# Patient Record
Sex: MALE | URBAN - METROPOLITAN AREA
[De-identification: names, ages, dates, MRNs, and addresses within clinical notes are randomized per-mention and may not be internally consistent; named-entity substitution may affect disease eponyms.]

---

## 2023-01-15 ENCOUNTER — EMERGENCY (EMERGENCY)
Facility: HOSPITAL | Age: 43
LOS: 0 days | Discharge: HOME | End: 2023-01-16
Attending: EMERGENCY MEDICINE
Payer: COMMERCIAL

## 2023-01-15 VITALS
DIASTOLIC BLOOD PRESSURE: 98 MMHG | HEIGHT: 68 IN | TEMPERATURE: 99 F | WEIGHT: 240.08 LBS | SYSTOLIC BLOOD PRESSURE: 147 MMHG | HEART RATE: 112 BPM | RESPIRATION RATE: 18 BRPM | OXYGEN SATURATION: 100 %

## 2023-01-15 DIAGNOSIS — S89.91XA UNSPECIFIED INJURY OF RIGHT LOWER LEG, INITIAL ENCOUNTER: ICD-10-CM

## 2023-01-15 DIAGNOSIS — S83.91XA SPRAIN OF UNSPECIFIED SITE OF RIGHT KNEE, INITIAL ENCOUNTER: ICD-10-CM
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
## 2023-01-15 DIAGNOSIS — S93.601A UNSPECIFIED SPRAIN OF RIGHT FOOT, INITIAL ENCOUNTER: ICD-10-CM
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
## 2023-01-15 DIAGNOSIS — G89.11 ACUTE PAIN DUE TO TRAUMA: ICD-10-CM

## 2023-01-15 DIAGNOSIS — M79.651 PAIN IN RIGHT THIGH: ICD-10-CM
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
## 2023-01-15 DIAGNOSIS — S73.101A UNSPECIFIED SPRAIN OF RIGHT HIP, INITIAL ENCOUNTER: ICD-10-CM

## 2023-01-15 PROCEDURE — 73552 X-RAY EXAM OF FEMUR 2/>: CPT | Mod: 26,RT

## 2023-01-15 PROCEDURE — 73560 X-RAY EXAM OF KNEE 1 OR 2: CPT | Mod: 26,RT

## 2023-01-15 PROCEDURE — 73630 X-RAY EXAM OF FOOT: CPT | Mod: RT

## 2023-01-15 PROCEDURE — 96375 TX/PRO/DX INJ NEW DRUG ADDON: CPT

## 2023-01-15 PROCEDURE — 99284 EMERGENCY DEPT VISIT MOD MDM: CPT | Mod: 25

## 2023-01-15 PROCEDURE — 73502 X-RAY EXAM HIP UNI 2-3 VIEWS: CPT | Mod: RT

## 2023-01-15 PROCEDURE — 73552 X-RAY EXAM OF FEMUR 2/>: CPT | Mod: RT

## 2023-01-15 PROCEDURE — 73590 X-RAY EXAM OF LOWER LEG: CPT | Mod: 26,RT

## 2023-01-15 PROCEDURE — 96374 THER/PROPH/DIAG INJ IV PUSH: CPT

## 2023-01-15 PROCEDURE — 73590 X-RAY EXAM OF LOWER LEG: CPT | Mod: RT

## 2023-01-15 PROCEDURE — 73560 X-RAY EXAM OF KNEE 1 OR 2: CPT | Mod: RT

## 2023-01-15 PROCEDURE — 73502 X-RAY EXAM HIP UNI 2-3 VIEWS: CPT | Mod: 26,RT

## 2023-01-15 PROCEDURE — 99284 EMERGENCY DEPT VISIT MOD MDM: CPT

## 2023-01-15 RX ORDER — MORPHINE SULFATE 50 MG/1
4 CAPSULE, EXTENDED RELEASE ORAL ONCE
Refills: 0 | Status: DISCONTINUED | OUTPATIENT
Start: 2023-01-15 | End: 2023-01-15

## 2023-01-15 RX ORDER — ONDANSETRON 8 MG/1
4 TABLET, FILM COATED ORAL ONCE
Refills: 0 | Status: COMPLETED | OUTPATIENT
Start: 2023-01-15 | End: 2023-01-15

## 2023-01-15 RX ADMIN — MORPHINE SULFATE 4 MILLIGRAM(S): 50 CAPSULE, EXTENDED RELEASE ORAL at 23:36

## 2023-01-15 RX ADMIN — ONDANSETRON 4 MILLIGRAM(S): 8 TABLET, FILM COATED ORAL at 23:35

## 2023-01-15 NOTE — ED PROVIDER NOTE - PHYSICAL EXAMINATION
PA NOTE: GEN: AOX3, NAD. HEENT: Throat clear. Airway is patent. EYES: PERRLA. EOMI. Head: NC/AT. NECK: Supple, No JVD. FROM. C-spine non-tender. CV:S1S2, RRR, LUNGS: Non-labored breathing, no tachypnea. O2sat 100% RA. CTA b/l. No w/r/r. CHEST: Equal chest expansion and rise. No deformity. ABD: Soft, NT/ND, no rebound, no guarding. No CVAT. EXT: No e/c/c. 2+ distal pulses. RIGHT LEG: +Mild tenderness right hip, right knee, right shin and dorsum of right foot. Painful ROM. No obvious bony deformity. 2+ distal pulses. Able to straight raise RLE with pain. NVI. SKIN: No rashes. NEURO: No focal deficits. CN II-XII intact. FROM. 5/5 motor and sensory. ~Isaias Woodall PA-C

## 2023-01-15 NOTE — ED PROVIDER NOTE - CARE PROVIDER_API CALL
Juan Kennedy)  Orthopaedic Surgery  [address], [place] [id number]  Phone: [phone]  Fax: [phone]  Follow Up Time: Urgent

## 2023-01-15 NOTE — ED PROVIDER NOTE - ATTENDING APP SHARED VISIT CONTRIBUTION OF CARE
Attending Contribution to Care: I, Rosio Wilder, performed the initial face to face bedside interview with this patient regarding history of present illness, review of symptoms and relevant past medical, social and family history.  I completed an independent physical examination.  I was the initial provider who evaluated this patient. I have signed out the follow up of any pending tests (i.e. labs, radiological studies) to the ACP.  I have communicated the patient’s plan of care and disposition with the ACP.

## 2023-01-15 NOTE — ED ADULT TRIAGE NOTE - CHIEF COMPLAINT QUOTE
BIB EMS PT A&OX3 SPEAKING IN FULL SENTENCES. 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 C/O RIGHT HIP PAIN RADIATING DOWN LEG, UNABLE TO WALK. DENIES CP/SOB/N/V/D/FEVER/CHILLS. NO SIGNIFICANT MEDICAL HX. BIB EMS PT A&OX3 SPEAKING IN FULL SENTENCES. 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 C/O RIGHT HIP PAIN RADIATING DOWN LEG, UNABLE TO WALK. DENIES anticocagulation, loc. denies CP/SOB/N/V/D/FEVER/CHILLS. NO SIGNIFICANT MEDICAL HX.

## 2023-01-15 NOTE — ED PROVIDER NOTE - NSFOLLOWUPINSTRUCTIONS_ED_ALL_ED_FT
Foot Sprain       A foot sprain is an injury to one of the ligaments in the feet. Ligaments are strong tissues that connect bones to each other. The ligament can be stretched too much. In some cases, it may tear. A tear can be either partial or complete. The severity of the sprain depends on how much of the ligament was damaged or torn.      What are the causes?    This condition is usually caused by suddenly twisting or pivoting your foot.      What increases the risk?    You are more likely to develop this condition if:  •You play a sport, such as basketball or football.      •You exercise or play a sport without first warming up your muscles.      •You start a new workout or sport.      •You suddenly increase how long or hard you exercise or play a sport.      •You have injured your foot or ankle before.        What are the signs or symptoms?    Symptoms of this condition start soon after an injury and include:  •Pain, especially in the arch of your foot.      •Bruising.      •Swelling.      •Being unable to walk or use your foot to support body weight.        How is this diagnosed?    This condition is diagnosed with a medical history and physical exam. You may also have imaging tests, such as:  •X-rays to check for broken bones (fractures).      •An MRI to see if the ligament is torn.        How is this treated?    Treatment for this condition depends on the severity of the sprain. Mild sprains and major sprains can be treated with:  •Rest, ice, pressure (compression), and elevation (RICE). Elevation means raising your injured foot.      •Keeping your foot in a fixed position (immobilization) for a period of time. This is done if your ligament is overstretched or partially torn. Your health care provider will apply a bandage, splint, or walking boot to keep your foot from moving until it heals.      •Using crutches or a scooter for a few weeks to avoid bearing weight on your foot while it is healing.      •Physical therapy exercises to improve movement and strength in your foot.      Major sprains may also be treated with:  •Surgery. This is done if your ligament is fully torn and a procedure is needed to reconnect it to the bone.      •A cast or splint. This will be needed after surgery. A cast or splint will need to stay on your foot while it heals.        Follow these instructions at home:    If you have a bandage, splint, or boot:     •Wear it as told by your health care provider. Remove it only as told by your health care provider.      •Loosen it if your toes tingle, become numb, or turn cold and blue.      •Keep it clean and dry.      If you have a cast:     • Do not put pressure on any part of the cast until it is fully hardened. This may take several hours.      • Do not stick anything inside the cast to scratch your skin. Doing that increases your risk for infection.      •Check the skin around the cast every day. Tell your health care provider about any concerns.      •You may put lotion on dry skin around the edges of the cast. Do not put lotion on the skin underneath the cast.      •Keep it clean and dry.      Bathing     • Do not take baths, swim, or use a hot tub until your health care provider approves. Ask your health care provider if you may take showers. You may only be allowed to take sponge baths.    •If the bandage, splint, boot, or cast is not waterproof:  •Do not let it get wet.      •Cover it with a watertight covering when you take a bath or shower.          Managing pain, stiffness, and swelling    •If directed, put ice on the injured area. To do this:  •If you have a removable bandage, splint, or boot, remove it as told by your health care provider.      •Put ice in a plastic bag.      •Place a towel between your skin and the bag, or between your cast and the bag.      •Leave the ice on for 20 minutes, 2–3 times per day.      •Remove the ice if your skin turns bright red. This is very important. If you cannot feel pain, heat, or cold, you have a greater risk of damage to the area.        •Move your toes often to reduce stiffness and swelling.      •Elevate the injured area above the level of your heart while you are sitting or lying down.      Activity     • Do not use the injured foot to support your body weight until your health care provider says that you can. Use crutches or a scooter as told by your health care provider.      •Ask your health care provider what activities are safe for you. Do exercises as told by your health care provider.      •Gradually increase how much and how far you walk until your health care provider says it is safe to return to full activity.      Driving     •Ask your health care provider if the medicine prescribed to you requires you to avoid driving or using machinery.      •Ask your health care provider when it is safe to drive if you have a bandage, splint, boot, or cast on your foot.      General instructions     •Take over-the-counter and prescription medicines only as told by your health care provider.      •When you can walk without pain, wear supportive shoes that have stiff soles. Do not wear flip-flops. Do not walk barefoot.      •Keep all follow-up visits. This is important.        Contact a health care provider if:    •Medicine does not help your pain.      •Your bruising or swelling gets worse or does not get better with treatment.      •Your splint, boot, or cast is damaged.        Get help right away if:    •You develop severe numbness or tingling in your foot.      •Your foot turns blue, white, or gray, and it feels cold.        Summary    •A foot sprain is an injury to one of the ligaments in the feet. Ligaments are strong tissues that connect bones to each other.      •You may need a bandage, splint, boot, or cast to support your foot while it heals. Sometimes, surgery may be needed.      •You may need physical therapy exercises to improve movement and strength in your foot.      This information is not intended to replace advice given to you by your health care provider. Make sure you discuss any questions you have with your health care provider.                                                                                                                                                                                                                 Knee Sprain, Adult       A knee sprain is a stretch or tear in a knee ligament. Knee ligaments are tissues that connect bones in the knee to each other.      What are the causes?    This condition often results from:  •A fall.      •An injury to the knee.        What are the signs or symptoms?    Symptoms of this condition include:  •Trouble straightening or bending the leg.      •Swelling in the knee.      •Bruising around the knee.      •Tenderness or pain in the knee.      •Muscle spasms around the knee.        How is this diagnosed?    This condition may be diagnosed based on:  •A physical exam.      •A history of what happened just before you started to have symptoms.    •Tests, including:  •An X-ray. This may be done to make sure no bones are broken.      •An MRI. This may be done to check if the ligament is torn.      •Stress testing of the knee. This may be done to check ligament damage.          How is this treated?    Treatment for this condition may involve:  •Keeping the knee still (immobilized) with a cast, brace, or splint.      •Applying ice to the knee. This helps with pain and swelling.      •Raising (elevating) the knee above the level of your heart when you are resting. This helps with pain and swelling.      •Taking medicine for pain.      •Doing exercises to prevent or limit permanent weakness or stiffness in your knee.      •Having surgery to reconnect the ligament to the bone or to reconstruct it. This may be needed if the ligament is completely torn.        Follow these instructions at home:    If you have a splint or brace:     •Wear it as told by your health care provider. Remove it only as told by your health care provider.      •Check the skin around it every day. Tell your health care provider about any concerns.      •Loosen it if your toes tingle, become numb, or turn cold and blue.      •Keep it clean and dry.      If you have a cast:     • Do not stick anything inside it to scratch your skin. Doing that increases your risk of infection.      •Check the skin around it every day. Tell your health care provider about any concerns.      •You may put lotion on dry skin around the edges of the cast. Do not put lotion on the skin underneath the cast.      •Keep it clean and dry.      Bathing     If you have a splint, brace, or cast that is not waterproof:  •Do not let it get wet.      •Cover it with a watertight covering when you take a bath or a shower.        Managing pain, stiffness, and swelling  •If directed, put ice on the injured area. To do this:  •If you have a removable splint or brace, remove it as told by your health care provider.      •Put ice in a plastic bag.      •Place a towel between your skin and the bag or between your cast and the bag.      •Leave the ice on for 20 minutes, 2–3 times a day.        •Move your toes often to reduce stiffness and swelling.      •Elevate the injured area above the level of your heart while you are sitting or lying down.      General instructions    •Take over-the-counter and prescription medicines only as told by your health care provider.      •Do not use any products that contain nicotine or tobacco, such as cigarettes, e-cigarettes, and chewing tobacco. These can delay healing. If you need help quitting, ask your health care provider.      •Do exercises as told by your health care provider.      •Keep all follow-up visits as told by your health care provider. This is important.        Contact a health care provider if:    •You have pain that gets worse.      •The cast, brace, or splint does not fit right.      •The cast, brace, or splint gets damaged.        Get help right away if:    •You cannot use your injured knee to support any of your body weight (cannot bear weight).      •You cannot move the injured joint.      •You cannot walk more than a few steps without pain or without your knee buckling.      •You have significant pain, swelling, or numbness in the leg below the cast, brace, or splint.      •Your foot or toes are numb, cold, or blue after loosening your splint or brace.        Summary    •A knee sprain is a stretch or tear in a knee ligament that usually occurs as the result of a fall or injury.      •Treatment may involve immobilizing the knee with a cast, splint, or brace and then doing exercises.      •If the ligament is completely torn, it may require surgery to repair or replace the injured ligament.      This information is not intended to replace advice given to you by your health care provider. Make sure you discuss any questions you have with your health care provider.           Hip Sprain    The skeleton inside a body with a close-up of the ligaments of the pelvis.   A hip sprain is a stretch or tear in one of the strong bands of tissue (ligaments) that connect the hip bone to the hip joint and pelvis. There are three types of hip sprains:  •A grade 1 sprain is a mildly stretched ligament. This injury causes pain and swelling, but the joint remains stable.      •A grade 2 sprain is a partial ligament tear. This injury may cause the hip joint to become looser (joint laxity).      •A grade 3 sprain is a complete ligament tear. This can make the hip joint unmovable and unstable.        What are the causes?    This condition may be caused by:  •A sudden injury (acute sprain). This can result from falling or severely twisting your hip joint. These injuries usually involve a large force placed on the joint.      •An overuse injury. This type of injury occurs gradually over time from repeatedly doing activities that put stress on your hip ligaments and muscles. This injury usually involves small amounts of stress repeatedly placed on the joint.        What increases the risk?    You are more likely to develop this condition if:  •You had a previous hip injury.      •Your hip joint is weak or stiff or you lack flexibility.      •You play contact sports.      •You are at risk for falling.      •You are overweight or do not get regular exercise.      •You try to do too much too quickly.      •You do not warm up properly before activity.        What are the signs or symptoms?    Symptoms of this condition include:  •Hip pain.      •Pain that gets worse with movement or weight bearing.      •Swelling or bruising at the hip joint.      •Difficulty moving the hip.      •Instability of the hip.      •Tingling or numbness in the leg.      •Muscle weakness in the hip or leg.      •Hearing a noise like a pop or feeling a tear at the time of the injury.        How is this diagnosed?    This condition may be diagnosed based on:  •Your symptoms and history of an injury or activity that puts stress on the hip.      •A physical exam. The health care provider will check the movement, muscle strength, and stability of your hip.      •Imaging tests such as an X-ray or MRI. These tests can show how severe the sprain is and can be used to rule out a broken bone.        How is this treated?    At first, this condition may be treated by resting and icing the hip. Your health care provider may also recommend taking a nonsteroidal anti-inflammatory drug (NSAID) to reduce pain and swelling. Additional treatment depends on the severity of the sprain.  •A grade 1 or 2 sprain may only need treatment with rest, ice, and medicine.      •A grade 3 sprain may require surgery to repair the ligament.      You may also need to do certain exercises to strengthen muscles and maintain full movement (physical therapy).      Follow these instructions at home:      Managing pain, stiffness, and swelling   A bag of ice on a towel on the skin. •If directed, put ice on your hip:  •Put ice in a plastic bag.      •Place a towel between your skin and the bag.      •Leave the ice on for 20 minutes, 2–3 times a day.        •Move your toes and bend your knee often to reduce stiffness and swelling.      Activity     •Avoid activities that cause hip pain.       •Return to your normal activities as told by your health care provider. Ask your health care provider what activities are safe for you.      •Do physical therapy exercises as told by your health care provider.      General instructions     •Take over-the-counter and prescription medicines only as told by your health care provider.    •Ask your health care provider if the medicine prescribed to you:  •Requires you to avoid driving or using heavy machinery.    •Can cause constipation. You may need to take actions to prevent or treat constipation, such as:  •Drink enough fluid to keep your urine pale yellow.      •Take over-the-counter or prescription medicines.      •Eat foods that are high in fiber, such as beans, whole grains, and fresh fruits and vegetables.      •Limit foods that are high in fat and processed sugars, such as fried or sweet foods.          • Do not use any products that contain nicotine or tobacco, such as cigarettes, e-cigarettes, and chewing tobacco. These can delay healing. If you need help quitting, ask your health care provider.      •Keep all follow-up visits as told by your health care provider. This is important.        Contact a health care provider if:    •Your pain, bruising, or swelling gets worse.      •You develop any new symptoms.      •Your symptoms are not improving at home.        Get help right away if:    •You have excessive swelling.      •Your hip feels very unstable.      •You have a loss of feeling in the hip or leg.      •Your skin changes color in the affected area.        Summary    •A hip sprain is an injury to a ligament in your hip. It can range from a mild stretch to a complete tear.      •A hip sprain can be caused by an acute injury or repetitive stress.      •Symptoms include pain, swelling, and bruising.      •Rest and icing are the first treatments for a hip sprain. Other treatments depend on the severity of the sprain.      This information is not intended to replace advice given to you by your health care provider. Make sure you discuss any questions you have with your health care provider.

## 2023-01-15 NOTE — ED PROVIDER NOTE - CARE PLAN
Principal Discharge DX:	Right foot sprain  Secondary Diagnosis:	Knee sprain  Secondary Diagnosis:	Sprain of right hip   1

## 2023-01-15 NOTE — ED PROVIDER NOTE - OBJECTIVE STATEMENT
PA: Patient is a 42 year old male with no significant PMHx who presents to [facility] c/o right leg injury. Patient was carrying a heavy crate in a box truck when he lost his balance and twisted his right foot, and landed on right leg. Patient c/o right hip, right thigh, right knee, right lower leg and right foot pain. No other trauma or injuries. ~Isaias Woodall PA-C

## 2023-01-15 NOTE — ED PROVIDER NOTE - PATIENT PORTAL LINK FT
You can access the FollowMyHealth Patient Portal offered by  by registering at the following website: [email or web address]. By joining [employer]’s FollowMyHealth portal, you will also be able to view your health information using other applications (apps) compatible with our system.

## 2023-01-15 NOTE — ED PROVIDER NOTE - CLINICAL SUMMARY MEDICAL DECISION MAKING FREE TEXT BOX
PA note: No acute fracture on xrays. ACE wrap applied to right knee and right foot. All radiology results discussed in detail with patient. Patient re-examined and re-evaluated. Patient feels much better at this time, able to ambulate with mild discomfort. ED evaluation, Diagnosis and management discussed with the patient in detail. Workup results discussed with ED attending, OK to dc home. Close ORTHO follow up encouraged, aftercare to assist with scheduling appointment ASAP. Strict ED return instructions discussed in detail and patient given the opportunity to ask any questions about their discharge diagnosis and instructions. Patient verbalized understanding. ~ Isaias Woodall PA-C

## 2023-01-15 NOTE — ED ADULT NURSE NOTE - OBJECTIVE STATEMENT
Pt BIBEMS c/o R hip, leg and foot pain r/t fall while trying to lift a heavy object "milk crate" at work and fell backwards and the crate fell onto their hip. Pt c/o 8/10 pain and is guarding/anxious. Pt has no PMH and NKDA. Pt denies hitting head or any LOC. Pt denies CP, SOB, N/V/D. Pt is Aox4 in the ED.

## 2023-01-15 NOTE — ED ADULT NURSE NOTE - NSIMPLEMENTINTERV_GEN_ALL_ED
Implemented All Fall Risk Interventions:  [place] to call system. Call bell, personal items and telephone within reach. Instruct patient to call for assistance. Room bathroom lighting operational. Non-slip footwear when patient is off stretcher. Physically safe environment: no spills, clutter or unnecessary equipment. Stretcher in lowest position, wheels locked, appropriate side rails in place. Provide visual cue, wrist band, yellow gown, etc. Monitor gait and stability. Monitor for mental status changes and reorient to person, place, and time. Review medications for side effects contributing to fall risk. Reinforce activity limits and safety measures with patient and family.

## 2023-01-15 NOTE — ED PROVIDER NOTE - PROGRESS NOTE DETAILS
PA: Patient seen and evaluated. Will get xrays, pain control. Reassess. ~Isaias Woodall PA-C PA note: No acute fracture on xrays. ACE wrap applied to right knee and right foot. All radiology results discussed in detail with patient. Patient re-examined and re-evaluated. Patient feels much better at this time, able to ambulate with mild discomfort. ED evaluation, Diagnosis and management discussed with the patient in detail. Workup results discussed with ED attending, OK to dc home. Close ORTHO follow up encouraged, aftercare to assist with scheduling appointment ASAP. Strict ED return instructions discussed in detail and patient given the opportunity to ask any questions about their discharge diagnosis and instructions. Patient verbalized understanding. ~ Isaias Woodall PA-C

## 2023-01-15 NOTE — ED ADULT NURSE NOTE - CHIEF COMPLAINT QUOTE
BIB EMS PT A&OX3 SPEAKING IN FULL SENTENCES. 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 C/O RIGHT HIP PAIN RADIATING DOWN LEG, UNABLE TO WALK. DENIES anticocagulation, loc. denies CP/SOB/N/V/D/FEVER/CHILLS. NO SIGNIFICANT MEDICAL HX.

## 2023-01-16 VITALS
OXYGEN SATURATION: 98 % | RESPIRATION RATE: 16 BRPM | DIASTOLIC BLOOD PRESSURE: 78 MMHG | HEART RATE: 88 BPM | SYSTOLIC BLOOD PRESSURE: 132 MMHG

## 2023-01-16 PROCEDURE — 73630 X-RAY EXAM OF FOOT: CPT | Mod: 26,RT

## 2023-01-16 RX ORDER — CYCLOBENZAPRINE HYDROCHLORIDE 10 MG/1
1 TABLET, FILM COATED ORAL
Qty: 20 | Refills: 0
Start: 2023-01-16

## 2023-01-16 RX ORDER — OXYCODONE AND ACETAMINOPHEN 5; 325 MG/1; MG/1
1 TABLET ORAL
Qty: 12 | Refills: 0
Start: 2023-01-16

## 2023-01-16 NOTE — ED ADULT NURSE REASSESSMENT NOTE - NS ED NURSE REASSESS COMMENT FT1
Pt tolerated ambulation trial. Pt reports improvement on pain and was able to ambulate to the bathroom and back with no assistance. Pt safety and comfort maintained.
